# Patient Record
(demographics unavailable — no encounter records)

---

## 2025-03-20 NOTE — HISTORY OF PRESENT ILLNESS
[Yes] : Patient goes to dentist yearly [Toothpaste] : Primary Fluoride Source: Toothpaste [Needs Immunizations] : Needs immunizations [Eats meals with family] : eats meals with family [Has family members/adults to turn to for help] : has family members/adults to turn to for help [Grade: ____] : Grade: [unfilled] [Eats regular meals including adequate fruits and vegetables] : eats regular meals including adequate fruits and vegetables [Drinks non-sweetened liquids] : drinks non-sweetened liquids  [Calcium source] : calcium source [Has concerns about body or appearance] : does not have concerns about body or appearance [Has friends] : has friends [Uses electronic nicotine delivery system] : does not use electronic nicotine delivery system [Exposure to electronic nicotine delivery system] : no exposure to electronic nicotine delivery system [Uses tobacco] : does not use tobacco [Exposure to tobacco] : no exposure to tobacco [Uses drugs] : does not use drugs  [Exposure to drugs] : no exposure to drugs [Drinks alcohol] : does not drink alcohol [Exposure to alcohol] : no exposure to alcohol [Uses safety belts/safety equipment] : uses safety belts/safety equipment  [Has peer relationships free of violence] : has peer relationships free of violence [No] : Patient has not had sexual intercourse [HIV Screening Declined] : HIV Screening Declined [Has ways to cope with stress] : has ways to cope with stress [Displays self-confidence] : displays self-confidence [Has problems with sleep] : does not have problems with sleep [Gets depressed, anxious, or irritable/has mood swings] : does not get depressed, anxious, or irritable/has mood swings [Has thought about hurting self or considered suicide] : has not thought about hurting self or considered suicide [With Parent/Guardian] : parent/guardian [de-identified] : brushes am/pm [de-identified] : needs MCV #2 [de-identified] : special ed [de-identified] : interested in summer work [NO] : No [FreeTextEntry1] : 16 year old male for CPE: working papers no recent illness no h/o concussion, no cardiac hx h/o right ring finger fx 2023 = resolved with no residual issues NKA patient is not sexually active denies anxiety, depression has friends in school  would like to work this summer SYEP no complaints today

## 2025-03-20 NOTE — PHYSICAL EXAM

## 2025-03-20 NOTE — DISCUSSION/SUMMARY
[Normal Growth] : growth [Normal Development] : development  [No Elimination Concerns] : elimination [Continue Regimen] : feeding [No Skin Concerns] : skin [Normal Sleep Pattern] : sleep [None] : no medical problems [Anticipatory Guidance Given] : Anticipatory guidance addressed as per the history of present illness section [Physical Growth and Development] : physical growth and development [Social and Academic Competence] : social and academic competence [Emotional Well-Being] : emotional well-being [Risk Reduction] : risk reduction [Violence and Injury Prevention] : violence and injury prevention [No Vaccines] : no vaccines needed [No Medications] : ~He/She~ is not on any medications [Patient] : patient [Full Activity without restrictions including Physical Education & Athletics] : Full Activity without restrictions including Physical Education & Athletics [FreeTextEntry1] : 16 year old male for CPE: cleared for work labs sent - CBC, chol medical certificate issued Imm record reviewed - VIS/consent sent home for MCV #2 patient to f/u x one week for lab results health center services reviewed patient verbalizes understanding

## 2025-03-27 NOTE — HISTORY OF PRESENT ILLNESS
[FreeTextEntry6] : 16 year old male for results Hx: CPE on 3/20/25 for working papers no complaints today NKA

## 2025-03-27 NOTE — DISCUSSION/SUMMARY
[FreeTextEntry1] : 16 year old male for results lab results reviewed patient to return with consent for MCV discharged stable

## 2025-04-30 NOTE — HISTORY OF PRESENT ILLNESS
[FreeTextEntry6] : 16 y.o male presents to health center for MCV No complaints at this time Did not eat breakfast  NKDA

## 2025-04-30 NOTE — DISCUSSION/SUMMARY
[FreeTextEntry1] :  16 y.o. presents to [facility] for MCV Immunizations reviewed  Consent obtained  V/S stable  No complaints at this time  MCV given left deltoid, tolerated snack and water provided Counseling/education provided on health maintenance and vaccine Answered all questions and concerns  RTC PRN  Safe D/C to class   [] : The components of the vaccine(s) to be administered today are listed in the plan of care. The disease(s) for which the vaccine(s) are intended to prevent and the risks have been discussed with the caretaker.  The risks are also included in the appropriate vaccination information statements which have been provided to the patient's caregiver.  The caregiver has given consent to vaccinate.